# Patient Record
Sex: FEMALE | Race: WHITE | NOT HISPANIC OR LATINO | Employment: UNEMPLOYED | URBAN - METROPOLITAN AREA
[De-identification: names, ages, dates, MRNs, and addresses within clinical notes are randomized per-mention and may not be internally consistent; named-entity substitution may affect disease eponyms.]

---

## 2018-02-09 ENCOUNTER — APPOINTMENT (OUTPATIENT)
Dept: LAB | Facility: HOSPITAL | Age: 43
End: 2018-02-09

## 2018-02-09 ENCOUNTER — OFFICE VISIT (OUTPATIENT)
Dept: FAMILY MEDICINE CLINIC | Facility: CLINIC | Age: 43
End: 2018-02-09

## 2018-02-09 VITALS
HEIGHT: 62 IN | OXYGEN SATURATION: 99 % | TEMPERATURE: 98.7 F | WEIGHT: 149 LBS | HEART RATE: 71 BPM | DIASTOLIC BLOOD PRESSURE: 72 MMHG | BODY MASS INDEX: 27.42 KG/M2 | SYSTOLIC BLOOD PRESSURE: 124 MMHG

## 2018-02-09 DIAGNOSIS — B18.2 CHRONIC HEPATITIS C WITHOUT HEPATIC COMA (HCC): ICD-10-CM

## 2018-02-09 DIAGNOSIS — R50.9 FEVER, UNKNOWN ORIGIN: ICD-10-CM

## 2018-02-09 DIAGNOSIS — Z00.00 GENERAL MEDICAL EXAM: Primary | ICD-10-CM

## 2018-02-09 DIAGNOSIS — R73.9 HYPERGLYCEMIA: ICD-10-CM

## 2018-02-09 DIAGNOSIS — M79.7 FIBROMYALGIA: ICD-10-CM

## 2018-02-09 PROBLEM — F19.11 HISTORY OF SUBSTANCE ABUSE (HCC): Status: ACTIVE | Noted: 2018-02-09

## 2018-02-09 LAB
ALBUMIN SERPL-MCNC: 4.3 G/DL (ref 3.2–4.8)
ALBUMIN/GLOB SERPL: 1.4 G/DL (ref 1.5–2.5)
ALP SERPL-CCNC: 101 U/L (ref 25–100)
ALT SERPL W P-5'-P-CCNC: 24 U/L (ref 7–40)
ANION GAP SERPL CALCULATED.3IONS-SCNC: 7 MMOL/L (ref 3–11)
AST SERPL-CCNC: 21 U/L (ref 0–33)
BASOPHILS # BLD AUTO: 0.02 10*3/MM3 (ref 0–0.2)
BASOPHILS NFR BLD AUTO: 0.2 % (ref 0–1)
BILIRUB CONJ SERPL-MCNC: 0.1 MG/DL (ref 0–0.2)
BILIRUB SERPL-MCNC: 0.3 MG/DL (ref 0.3–1.2)
BUN BLD-MCNC: 10 MG/DL (ref 9–23)
BUN/CREAT SERPL: 16.7 (ref 7–25)
CALCIUM SPEC-SCNC: 9.3 MG/DL (ref 8.7–10.4)
CHLORIDE SERPL-SCNC: 106 MMOL/L (ref 99–109)
CO2 SERPL-SCNC: 27 MMOL/L (ref 20–31)
CREAT BLD-MCNC: 0.6 MG/DL (ref 0.6–1.3)
DEPRECATED RDW RBC AUTO: 46.1 FL (ref 37–54)
EOSINOPHIL # BLD AUTO: 0.09 10*3/MM3 (ref 0–0.3)
EOSINOPHIL NFR BLD AUTO: 0.9 % (ref 0–3)
ERYTHROCYTE [DISTWIDTH] IN BLOOD BY AUTOMATED COUNT: 13.7 % (ref 11.3–14.5)
GFR SERPL CREATININE-BSD FRML MDRD: 111 ML/MIN/1.73
GLOBULIN UR ELPH-MCNC: 3.1 GM/DL
GLUCOSE BLD-MCNC: 97 MG/DL (ref 70–100)
HAV IGM SERPL QL IA: ABNORMAL
HBA1C MFR BLD: 5.5 % (ref 4.8–5.6)
HBV CORE IGM SERPL QL IA: ABNORMAL
HBV SURFACE AG SERPL QL IA: ABNORMAL
HCT VFR BLD AUTO: 44 % (ref 34.5–44)
HCV AB SER DONR QL: REACTIVE
HGB BLD-MCNC: 14.7 G/DL (ref 11.5–15.5)
IMM GRANULOCYTES # BLD: 0 10*3/MM3 (ref 0–0.03)
IMM GRANULOCYTES NFR BLD: 0 % (ref 0–0.6)
LYMPHOCYTES # BLD AUTO: 4.86 10*3/MM3 (ref 0.6–4.8)
LYMPHOCYTES NFR BLD AUTO: 51 % (ref 24–44)
MCH RBC QN AUTO: 30.8 PG (ref 27–31)
MCHC RBC AUTO-ENTMCNC: 33.4 G/DL (ref 32–36)
MCV RBC AUTO: 92.2 FL (ref 80–99)
MONOCYTES # BLD AUTO: 0.43 10*3/MM3 (ref 0–1)
MONOCYTES NFR BLD AUTO: 4.5 % (ref 0–12)
NEUTROPHILS # BLD AUTO: 4.13 10*3/MM3 (ref 1.5–8.3)
NEUTROPHILS NFR BLD AUTO: 43.4 % (ref 41–71)
PLATELET # BLD AUTO: 205 10*3/MM3 (ref 150–450)
PMV BLD AUTO: 10.6 FL (ref 6–12)
POTASSIUM BLD-SCNC: 4.4 MMOL/L (ref 3.5–5.5)
PROT SERPL-MCNC: 7.4 G/DL (ref 5.7–8.2)
RBC # BLD AUTO: 4.77 10*6/MM3 (ref 3.89–5.14)
SODIUM BLD-SCNC: 140 MMOL/L (ref 132–146)
WBC NRBC COR # BLD: 9.53 10*3/MM3 (ref 3.5–10.8)

## 2018-02-09 PROCEDURE — 82248 BILIRUBIN DIRECT: CPT | Performed by: PHYSICIAN ASSISTANT

## 2018-02-09 PROCEDURE — 80053 COMPREHEN METABOLIC PANEL: CPT | Performed by: PHYSICIAN ASSISTANT

## 2018-02-09 PROCEDURE — 87522 HEPATITIS C REVRS TRNSCRPJ: CPT | Performed by: PHYSICIAN ASSISTANT

## 2018-02-09 PROCEDURE — 85025 COMPLETE CBC W/AUTO DIFF WBC: CPT | Performed by: PHYSICIAN ASSISTANT

## 2018-02-09 PROCEDURE — 36415 COLL VENOUS BLD VENIPUNCTURE: CPT | Performed by: PHYSICIAN ASSISTANT

## 2018-02-09 PROCEDURE — 80074 ACUTE HEPATITIS PANEL: CPT | Performed by: PHYSICIAN ASSISTANT

## 2018-02-09 PROCEDURE — 83036 HEMOGLOBIN GLYCOSYLATED A1C: CPT | Performed by: PHYSICIAN ASSISTANT

## 2018-02-09 PROCEDURE — 99386 PREV VISIT NEW AGE 40-64: CPT | Performed by: PHYSICIAN ASSISTANT

## 2018-02-09 RX ORDER — BUPRENORPHINE HYDROCHLORIDE AND NALOXONE HYDROCHLORIDE DIHYDRATE 8; 2 MG/1; MG/1
TABLET SUBLINGUAL 2 TIMES DAILY
COMMUNITY
Start: 2018-01-30

## 2018-02-09 RX ORDER — CYCLOBENZAPRINE HCL 10 MG
10 TABLET ORAL 2 TIMES DAILY PRN
COMMUNITY
Start: 2018-02-09 | End: 2018-06-21

## 2018-02-11 NOTE — PROGRESS NOTES
Subjective   Verona Keane is a 42 y.o. female  Establish Care (New patient establish Care) and Hepatitis C (req treatment for recent diagnosis of Hep C )      History of Present Illness  Patient presents today as a new patient to our practice to establish care and for a preventive medical visit.  Patient is here to determine screening labs and tests that are due and to determine immunization status as well.  She was diagnosed with hepatitis C last year, she is interested in a referral to a liver specialist where she can have this treated.  She feels that she is having symptoms from her hepatitis C.  She states she generally runs her low-grade fever about once a week, last time she had labs was in November.  She was being seen by a gastroenterologist, female . associated with [facility], last saw her in November, she's not sure if her neighbor members are being a name that seemed it was of [place] descent.  She states they were suggesting to just follow her instead of treating her and she is interested in more of a treatment approach.  She has a history of substance abuse but has been sober for 5 years.  She states that she goes to meetings at the [employer] every Thursday and is working as a sponsor as well as living with a sponsor.  She states she has a good support system and her [place] of friends and family are clean.  Her sister, Concepcion De León comes here as a patient.  She sees a neurologist who prescribes her Lyrica for fibromyalgia.  This is stable and she will continue seeing her for this medication.  She was concerned because she was told once when she was in the hospital last year that her blood sugar was elevated but she is not sure if she has ever been diagnosed with diabetes.  Patient will be counseled regarding preventative medicine issues such as regular exercise and  healthy diet as well.    The following portions of the patient's history were reviewed and updated as appropriate: allergies, current  medications, past social history and problem list    Review of Systems   Constitutional: Positive for fatigue and fever.   HENT: Negative.    Eyes: Negative.    Respiratory: Negative.    Cardiovascular: Negative.    Gastrointestinal: Negative.    Endocrine: Negative.    Genitourinary: Negative.    Musculoskeletal: Positive for arthralgias and myalgias.   Skin: Negative.    Allergic/Immunologic: Negative.    Neurological: Negative.    Hematological: Negative.    Psychiatric/Behavioral: Negative.    All other systems reviewed and are negative.      Objective     Vitals:    02/09/18 1414   BP: 124/72   Pulse: 71   Temp: 98.7 °F (37.1 °C)   SpO2: 99%       Physical Exam   Constitutional: She is oriented to person, place, and time. She appears well-developed and well-nourished.   HENT:   Head: Normocephalic and atraumatic.   Right Ear: External ear normal.   Left Ear: External ear normal.   Nose: Nose normal.   Mouth/Throat: Oropharynx is clear and moist.   Eyes: Conjunctivae and EOM are normal. Pupils are equal, round, and reactive to light. No scleral icterus.   Neck: Normal range of motion. Neck supple. No JVD present. Carotid bruit is not present. No thyromegaly present.   Cardiovascular: Normal rate, regular rhythm, normal heart sounds and intact distal pulses.    No murmur heard.  Pulmonary/Chest: Effort normal and breath sounds normal.   Abdominal: Soft. Bowel sounds are normal. She exhibits no mass. There is no hepatosplenomegaly. There is no tenderness.   Musculoskeletal: Normal range of motion. She exhibits no edema.   Lymphadenopathy:     She has no cervical adenopathy.   Neurological: She is alert and oriented to person, place, and time. She has normal reflexes. No cranial nerve deficit.   Skin: Skin is warm and dry.   Psychiatric: She has a normal mood and affect.   Nursing note and vitals reviewed.      Assessment/Plan     Diagnoses and all orders for this visit:    General medical exam    Fever, unknown  origin  -     Comprehensive Metabolic Panel  -     CBC & Differential  -     Cancel: Hepatic Function Panel  -     Hepatitis Panel, Acute  -     CBC Auto Differential  -     Bilirubin, Direct; Future  -     Bilirubin, Direct  -     HCV RT-PCR,Quant(Non-Graph); Future  -     HCV RT-PCR,Quant(Non-Graph)    Chronic hepatitis C without hepatic coma  -     Comprehensive Metabolic Panel  -     CBC & Differential  -     Cancel: Hepatic Function Panel  -     Hepatitis Panel, Acute  -     CBC Auto Differential  -     Bilirubin, Direct; Future  -     Bilirubin, Direct  -     HCV RT-PCR,Quant(Non-Graph); Future  -     HCV RT-PCR,Quant(Non-Graph)  -     Ambulatory Referral to Gastroenterology    Fibromyalgia  -     CBC & Differential  -     Cancel: Hepatic Function Panel  -     CBC Auto Differential  -     Bilirubin, Direct; Future  -     Bilirubin, Direct    Hyperglycemia  -     Comprehensive Metabolic Panel  -     Hemoglobin A1c    Other orders  -     LYRICA 150 MG capsule; 3 (Three) Times a Day As Needed.  -     cyclobenzaprine (FLEXERIL) 10 MG tablet; Take 10 mg by mouth 2 (Two) Times a Day As Needed.  -     buprenorphine-naloxone (SUBOXONE) 8-2 MG per SL tablet; 2 (Two) Times a Day.

## 2018-02-13 LAB
HCV RNA SERPL NAA+PROBE-ACNC: NORMAL IU/ML
TEST INFORMATION: NORMAL

## 2018-02-16 ENCOUNTER — OFFICE VISIT (OUTPATIENT)
Dept: GASTROENTEROLOGY | Facility: CLINIC | Age: 43
End: 2018-02-16

## 2018-02-16 VITALS
SYSTOLIC BLOOD PRESSURE: 108 MMHG | HEART RATE: 94 BPM | BODY MASS INDEX: 27.42 KG/M2 | HEIGHT: 62 IN | DIASTOLIC BLOOD PRESSURE: 62 MMHG | WEIGHT: 149 LBS | TEMPERATURE: 97.7 F | OXYGEN SATURATION: 96 % | RESPIRATION RATE: 16 BRPM

## 2018-02-16 DIAGNOSIS — R79.89 ABNORMAL LIVER FUNCTION TESTS: Primary | ICD-10-CM

## 2018-02-16 PROCEDURE — 99203 OFFICE O/P NEW LOW 30 MIN: CPT | Performed by: INTERNAL MEDICINE

## 2018-02-16 NOTE — PROGRESS NOTES
"PCP: Traci Mcmahon PA-C    Chief Complaint   Patient presents with   • Hepatitis C        History of Present Illness:   Verona Keane is a 42 y.o. female who presents to the GI clinic for assessment of hepatitis c. States she was admitted at [facility] around a year ago with jaundice. Told she had \"acute hepatitis\". Thinks she had acute hep c. Last labs checked 2/9/18 show normal lfts, + hep c ab, (-) rna, (-) hep b sag. States occasionally she has feeling of fever with acute warmth. No history of chronic liver disease. Risk factors: IVDU 1.5 years ago, heroine. Intranasal drug use.     Past Medical History:   Diagnosis Date   • Anxiety    • Depression    • Drug use    • Hepatitis C    • Seizures        Past Surgical History:   Procedure Laterality Date   • CARPAL TUNNEL RELEASE      right wrist   • COLONOSCOPY     • HYSTERECTOMY      MARCH 2010   • OOPHORECTOMY Bilateral     MARCH 2010   • UPPER GASTROINTESTINAL ENDOSCOPY     • WRIST SURGERY      Left surgery         Current Outpatient Prescriptions:   •  buprenorphine-naloxone (SUBOXONE) 8-2 MG per SL tablet, 2 (Two) Times a Day., Disp: , Rfl:   •  cyclobenzaprine (FLEXERIL) 10 MG tablet, Take 10 mg by mouth 2 (Two) Times a Day As Needed., Disp: , Rfl:   •  LYRICA 150 MG capsule, 3 (Three) Times a Day As Needed., Disp: , Rfl:     No Known Allergies    Family History   Problem Relation Age of Onset   • Breast cancer Mother 58   • COPD Mother    • Diabetes Mother    • Cancer Mother    • Lung cancer Mother    • Hypertension Father    • Diabetes Maternal Grandmother    • Alcohol abuse Maternal Grandfather    • Cancer Paternal Grandmother    • Ovarian cancer Neg Hx        Social History     Social History   • Marital status: Legally      Spouse name: N/A   • Number of children: N/A   • Years of education: N/A     Occupational History   • Not on file.     Social History Main Topics   • Smoking status: Current Every Day Smoker     Packs/day: 1.00     " Types: Cigarettes   • Smokeless tobacco: Never Used   • Alcohol use No   • Drug use: No   • Sexual activity: Not on file     Other Topics Concern   • Not on file     Social History Narrative       Review of Systems   Constitutional: Positive for chills, fatigue, fever and unexpected weight change.   HENT: Negative for nosebleeds.    Eyes: Negative for pain.   Respiratory: Negative for choking.    Gastrointestinal: Positive for abdominal distention.   Skin:        Spots on hands and feet   Allergic/Immunologic: Negative for food allergies and immunocompromised state.   Psychiatric/Behavioral: Negative for confusion and suicidal ideas.     All other systems reviewed and are negative.    Vitals:    02/16/18 1006   BP: 108/62   Pulse: 94   Resp: 16   Temp: 97.7 °F (36.5 °C)   SpO2: 96%       Physical Exam  General Appearance:  Vitals as above. no acute distress  Head/face:  Normocephalic, atraumatic  Eyes:   EOMI, no conjunctivitis or icterus   Nose/Sinuses:  Nares patent bilaterally without discharge or lesions  Mouth/Throat:  Posterior pharynx is pink without drainage or exudates;  dentition is in good condition and repair  Neck:  trachea is midline, no thyromegaly  Lungs:  Clear to auscultation bilaterally  Heart:  Regular rate and rhythm without murmur, gallop or rub  Abdomen:  Soft, non-tender to palpation, no obvious masses, bowel sounds positive in four quadrants; no abdominal bruits; no guarding or rebound tenderness  Neurologic:  Alert; no focal deficits; age appropriate behavior and speech  Psychiatric: mood and affect are congruent  Vascular: extremities without edema  Skin: no rash or cyanosis.      Assessment/Plan  1.) History of jaundice, suspect acute hepatitis   2.) hep c ab +  She appears to have cleared hepatitis c with negative rna. This happens around 20% of acquired adult acute hep c.  Would acquire records from [facility] and repeat hcv rna, abdominal u/s, hep b core total, hep a total, and hep b s ab  in 6 months. Advised to avoid blood contact from hep c virus. RTC in 6 months with the following performed.    Armani Cade MD  2/16/2018

## 2018-02-21 ENCOUNTER — TELEPHONE (OUTPATIENT)
Dept: FAMILY MEDICINE CLINIC | Facility: CLINIC | Age: 43
End: 2018-02-21

## 2018-02-21 NOTE — TELEPHONE ENCOUNTER
----- Message from Teagan Gamboa sent at 2/21/2018  1:49 PM EST -----  Contact: PT.  PT. IS WANTING TAMARA MOORE, TO CONTACT HER BACK RE. LAB RESULTS; HAS A ?, RE. THEM.  PT. CAN BE REACHED @ [facility] #.

## 2018-02-26 ENCOUNTER — TELEPHONE (OUTPATIENT)
Dept: FAMILY MEDICINE CLINIC | Facility: CLINIC | Age: 43
End: 2018-02-26

## 2018-02-26 NOTE — TELEPHONE ENCOUNTER
----- Message from Elizabeth Herrera sent at 2/26/2018 11:57 AM EST -----  PT. IS WANTING TAMARA MOORE, TO CONTACT HER BACK RE. LAB RESULTS..  Thanks,  Norah.

## 2018-03-07 ENCOUNTER — OFFICE VISIT (OUTPATIENT)
Dept: FAMILY MEDICINE CLINIC | Facility: CLINIC | Age: 43
End: 2018-03-07

## 2018-03-07 VITALS
TEMPERATURE: 98.4 F | OXYGEN SATURATION: 96 % | HEART RATE: 92 BPM | WEIGHT: 152.2 LBS | HEIGHT: 62 IN | BODY MASS INDEX: 28.01 KG/M2 | DIASTOLIC BLOOD PRESSURE: 72 MMHG | SYSTOLIC BLOOD PRESSURE: 112 MMHG

## 2018-03-07 DIAGNOSIS — E16.2 HYPOGLYCEMIA: Primary | ICD-10-CM

## 2018-03-07 DIAGNOSIS — B18.2 CHRONIC HEPATITIS C WITHOUT HEPATIC COMA (HCC): ICD-10-CM

## 2018-03-07 PROCEDURE — 99213 OFFICE O/P EST LOW 20 MIN: CPT | Performed by: PHYSICIAN ASSISTANT

## 2018-03-07 NOTE — PROGRESS NOTES
Sanket Keane is a 42 y.o. female  Follow-up (wants to discuss most recent lab results)      History of Present Illness  Patient comes in today for follow-up.  She states she will like to go over her labs rechecked last month.  She has seen gastroenterology and was pleased to find out that her hepatitis C is in remission at this time, they're going to continue to monitor her.  No treatment needed at this time.  She states she's been doing very well, sees her Suboxone doctor and gets her Lyrica through them as well.  However she states she has noticed lately that she is getting dizzy, lightheaded and shaky about every other day.  She doesn't feel that she's skipping meals but has found a correlation to eating.  She states that if she eats something when the symptoms start in the go away and she feels better.  She feels that her blood sugars dropping.  She's not sure what to do about it.  She did have a history of hyperglycemia on labs a couple of months ago, A1c and glucose were normal when I checked it last month.  The following portions of the patient's history were reviewed and updated as appropriate: allergies, current medications, past social history and problem list    Review of Systems   Constitutional: Negative for activity change and fever.   HENT: Negative for ear pain.    Respiratory: Negative for chest tightness and shortness of breath.    Cardiovascular: Negative for chest pain and palpitations.   Gastrointestinal: Negative for abdominal pain, diarrhea, nausea and vomiting.   Genitourinary: Negative for difficulty urinating and dysuria.   Neurological: Positive for tremors, weakness and light-headedness. Negative for dizziness, seizures, syncope, facial asymmetry, speech difficulty, numbness and headaches.   Psychiatric/Behavioral: Negative.        Objective     Vitals:    03/07/18 0841   BP: 112/72   Pulse: 92   Temp: 98.4 °F (36.9 °C)   SpO2: 96%       Physical Exam   Constitutional: She  is oriented to person, place, and time. She appears well-developed and well-nourished. No distress.   HENT:   Head: Normocephalic and atraumatic.   Right Ear: Hearing and tympanic membrane normal.   Left Ear: Hearing and tympanic membrane normal.   Eyes: Pupils are equal, round, and reactive to light.   Neck: Normal carotid pulses present. Carotid bruit is not present.   Cardiovascular: Normal rate, regular rhythm and normal heart sounds.    Pulmonary/Chest: Effort normal and breath sounds normal. No respiratory distress.   Musculoskeletal: Normal range of motion.   Neurological: She is alert and oriented to person, place, and time. She displays normal reflexes. No cranial nerve deficit. She exhibits normal muscle tone. Coordination normal.   Skin: She is not diaphoretic.   Psychiatric: She has a normal mood and affect. Her behavior is normal. Judgment and thought content normal.   Nursing note and vitals reviewed.      Assessment/Plan     Diagnoses and all orders for this visit:    Hypoglycemia  -     Ambulatory Referral to Nutrition Services    Chronic hepatitis C without hepatic coma    Discussed importance of following a high-protein, low carb diet low in concentrated sweets as well as avoiding meals, will refer to nutrition services for further guidance.  Follow-up as needed and 3 months for recheck.

## 2018-03-07 NOTE — PROGRESS NOTES
Sanket Keane is a 42 y.o. female  Follow-up (wants to discuss most recent lab results)      History of Present Illness    The following portions of the patient's history were reviewed and updated as appropriate: allergies, current medications, past social history and problem list    Review of Systems    Objective     Vitals:    03/07/18 0841   BP: 112/72   Pulse: 92   Temp: 98.4 °F (36.9 °C)   SpO2: 96%       Physical Exam    Assessment/Plan     There are no diagnoses linked to this encounter.

## 2018-04-03 ENCOUNTER — APPOINTMENT (OUTPATIENT)
Dept: NUTRITION | Facility: HOSPITAL | Age: 43
End: 2018-04-03

## 2018-06-21 ENCOUNTER — OFFICE VISIT (OUTPATIENT)
Dept: FAMILY MEDICINE CLINIC | Facility: CLINIC | Age: 43
End: 2018-06-21

## 2018-06-21 VITALS
HEIGHT: 62 IN | OXYGEN SATURATION: 99 % | SYSTOLIC BLOOD PRESSURE: 122 MMHG | HEART RATE: 83 BPM | TEMPERATURE: 98.2 F | WEIGHT: 148 LBS | BODY MASS INDEX: 27.23 KG/M2 | DIASTOLIC BLOOD PRESSURE: 64 MMHG

## 2018-06-21 DIAGNOSIS — M79.7 FIBROMYALGIA: ICD-10-CM

## 2018-06-21 DIAGNOSIS — E16.2 HYPOGLYCEMIA: Primary | ICD-10-CM

## 2018-06-21 PROCEDURE — 99213 OFFICE O/P EST LOW 20 MIN: CPT | Performed by: PHYSICIAN ASSISTANT

## 2018-06-21 NOTE — PROGRESS NOTES
Subjective   Verona Keane is a 43 y.o. female  Dizziness (Follow up on dizziness, still having some balance issues)      History of Present Illness  Patient comes in for evaluation of symptoms that she is initially calling dizziness but she states really is more trembling feeling.  She states she's expresses on off for about a year.  She states that she'll start feeling shaky and trembly in her legs and he moves all over her body.  States feels weak like she is going to pass out but she doesn't.  She states she feels better if she eats something sweet when this happens.  She's had her blood sugar checked and has not been seen to be diabetic.  We did full labs on her within the past year that were normal except for chronically stable hepatitis C.  She states she's bad about skipping meals, doesn't eat a lot throughout the day.  She is a smoker.  She has a neurologist that she is seen on off for the past 10 years for migraine headaches, she is discussed this with her she does not feel that it is seizures.  The following portions of the patient's history were reviewed and updated as appropriate: allergies, current medications, past social history and problem list    Review of Systems   Constitutional: Negative for activity change and fever.   HENT: Negative for ear pain.    Respiratory: Negative for chest tightness and shortness of breath.    Cardiovascular: Negative for chest pain and palpitations.   Gastrointestinal: Negative for abdominal pain, diarrhea, nausea and vomiting.   Genitourinary: Negative for difficulty urinating and dysuria.   Neurological: Positive for tremors and light-headedness. Negative for dizziness, seizures, syncope, facial asymmetry, weakness and numbness.   Psychiatric/Behavioral: Negative.        Objective     Vitals:    06/21/18 0848   BP: 122/64   Pulse: 83   Temp: 98.2 °F (36.8 °C)   SpO2: 99%       Physical Exam   Constitutional: She is oriented to person, place, and time. She appears  well-developed and well-nourished. No distress.   HENT:   Head: Normocephalic and atraumatic.   Right Ear: Hearing and tympanic membrane normal.   Left Ear: Hearing and tympanic membrane normal.   Eyes: Pupils are equal, round, and reactive to light.   Neck: Normal carotid pulses present. Carotid bruit is not present.   Cardiovascular: Normal rate, regular rhythm and normal heart sounds.    Pulmonary/Chest: Effort normal and breath sounds normal. No respiratory distress.   Musculoskeletal: Normal range of motion.   Neurological: She is alert and oriented to person, place, and time. She displays normal reflexes. No cranial nerve deficit. She exhibits normal muscle tone. Coordination normal.   Skin: She is not diaphoretic.   Psychiatric: She has a normal mood and affect. Her behavior is normal. Judgment and thought content normal.   Nursing note and vitals reviewed.      Assessment/Plan     Diagnoses and all orders for this visit:    Hypoglycemia  -     Ambulatory Referral to Nutrition Services    Fibromyalgia